# Patient Record
Sex: FEMALE | Race: WHITE | NOT HISPANIC OR LATINO | URBAN - METROPOLITAN AREA
[De-identification: names, ages, dates, MRNs, and addresses within clinical notes are randomized per-mention and may not be internally consistent; named-entity substitution may affect disease eponyms.]

---

## 2017-09-28 ENCOUNTER — APPOINTMENT (RX ONLY)
Dept: URBAN - METROPOLITAN AREA CLINIC 121 | Facility: CLINIC | Age: 57
Setting detail: DERMATOLOGY
End: 2017-09-28

## 2017-09-28 DIAGNOSIS — T07XXXA INSECT BITE, NONVENOMOUS, OF OTHER, MULTIPLE, AND UNSPECIFIED SITES, WITHOUT MENTION OF INFECTION: ICD-10-CM

## 2017-09-28 PROBLEM — S90.861A INSECT BITE (NONVENOMOUS), RIGHT FOOT, INITIAL ENCOUNTER: Status: ACTIVE | Noted: 2017-09-28

## 2017-09-28 PROCEDURE — ? PRESCRIPTION

## 2017-09-28 PROCEDURE — 99213 OFFICE O/P EST LOW 20 MIN: CPT

## 2017-09-28 PROCEDURE — ? COUNSELING

## 2017-09-28 RX ORDER — AZITHROMYCIN DIHYDRATE 250 MG/1
TABLET, FILM COATED ORAL
Qty: 6 | Refills: 0 | Status: ERX

## 2017-09-28 RX ORDER — MUPIROCIN 20 MG/G
OINTMENT TOPICAL
Qty: 1 | Refills: 2 | Status: ERX

## 2017-09-28 ASSESSMENT — LOCATION ZONE DERM: LOCATION ZONE: FEET

## 2017-09-28 ASSESSMENT — LOCATION DETAILED DESCRIPTION DERM: LOCATION DETAILED: RIGHT DORSAL FOOT

## 2017-09-28 ASSESSMENT — LOCATION SIMPLE DESCRIPTION DERM: LOCATION SIMPLE: RIGHT FOOT

## 2022-07-09 ENCOUNTER — TELEPHONE ENCOUNTER (OUTPATIENT)
Dept: URBAN - METROPOLITAN AREA CLINIC 121 | Facility: CLINIC | Age: 62
End: 2022-07-09

## 2022-07-09 RX ORDER — MESALAMINE 1.2 G/1
TAKE TWO TABLETS ONCE DAILY TABLET, DELAYED RELEASE ORAL
Refills: 11 | OUTPATIENT
Start: 2012-04-02 | End: 2012-07-09

## 2022-07-09 RX ORDER — ATORVASTATIN CALCIUM 20 MG/1
TABLET, FILM COATED ORAL ONCE A DAY
Refills: 0 | OUTPATIENT
Start: 2017-02-27 | End: 2017-05-23

## 2022-07-09 RX ORDER — ROSUVASTATIN CALCIUM 10 MG
TABLET ORAL TAKE AS DIRECTED
Refills: 0 | OUTPATIENT
Start: 2009-06-08 | End: 2010-06-17

## 2022-07-09 RX ORDER — PREDNISONE 10 MG/1
UD TABLET ORAL
Refills: 2 | OUTPATIENT
Start: 2010-01-04 | End: 2012-01-05

## 2022-07-09 RX ORDER — MESALAMINE 1.2 G/1
TAKE TWO TABLETS ONCE DAILY TABLET, DELAYED RELEASE ORAL
Refills: 0 | OUTPATIENT
Start: 2012-02-06 | End: 2012-04-02

## 2022-07-09 RX ORDER — PREDNISONE 10 MG/1
2 A DAY FOR 2 WEEKS THEN 1 A DAY TABLET ORAL
Refills: 0 | OUTPATIENT
Start: 2009-06-16 | End: 2011-12-22

## 2022-07-09 RX ORDER — PREDNISONE 20 MG/1
TABLET ORAL ONCE A DAY
Refills: 0 | OUTPATIENT
Start: 2010-01-04 | End: 2010-04-19

## 2022-07-09 RX ORDER — FERROUS FUMARATE AND POLYSACCHRIDE IRON VITAMIN MINERAL COMPLEX SUPPLEMENT 191.2; 135.9; 1; 210; 20; 5; 5; 7; 25; 3 MG/1; MG/1; MG/1; MG/1; MG/1; MG/1; MG/1; MG/1; MG/1; UG/1
CAPSULE ORAL
Refills: 0 | OUTPATIENT
Start: 2010-07-15 | End: 2010-09-10

## 2022-07-09 RX ORDER — MESALAMINE 1.2 G/1
2 A DAY TABLET, DELAYED RELEASE ORAL
Refills: 4 | OUTPATIENT
Start: 2010-06-17 | End: 2010-09-10

## 2022-07-10 ENCOUNTER — TELEPHONE ENCOUNTER (OUTPATIENT)
Dept: URBAN - METROPOLITAN AREA CLINIC 121 | Facility: CLINIC | Age: 62
End: 2022-07-10

## 2022-07-10 RX ORDER — PREDNISONE 10 MG/1
TAKE TWO TABLETS DAILY FOR 4 WEEKS THEN TAPER TO ONE TABLET DAILY FOR 2 WEEKS TABLET ORAL
Refills: 2 | Status: ACTIVE | COMMUNITY
Start: 2012-01-05

## 2022-07-10 RX ORDER — PREDNISONE 10 MG/1
2 A DAY FOR 1 WEEK THEN 1 A DAY FOR 1 WEEK THEN 1/2 TABLET DAILY FOR 2 WEEKS TABLET ORAL
Refills: 0 | Status: ACTIVE | COMMUNITY
Start: 2011-12-22

## 2022-07-10 RX ORDER — ATORVASTATIN CALCIUM 20 MG/1
TABLET, FILM COATED ORAL ONCE A DAY
Refills: 0 | Status: ACTIVE | COMMUNITY
Start: 2017-05-23

## 2022-07-10 RX ORDER — PREDNISONE 20 MG/1
1QD TABLET ORAL
Refills: 0 | Status: ACTIVE | COMMUNITY
Start: 2010-06-29

## 2022-07-10 RX ORDER — MESALAMINE 1.2 G/1
TAKE TWO TABLETS BY MOUTH TWICE DAILY TABLET, DELAYED RELEASE ORAL
Refills: 2 | Status: ACTIVE | COMMUNITY
Start: 2014-09-26

## 2022-07-10 RX ORDER — PREDNISONE 20 MG/1
TAKE 1 TABLET PO QD X 3WEEKS THEN DECREASE TO 10MG PO QD X 2 WEEKS TABLET ORAL ONCE A DAY
Refills: 1 | Status: ACTIVE | COMMUNITY
Start: 2009-10-23

## 2023-03-03 ENCOUNTER — APPOINTMENT (RX ONLY)
Dept: URBAN - METROPOLITAN AREA CLINIC 121 | Facility: CLINIC | Age: 63
Setting detail: DERMATOLOGY
End: 2023-03-03

## 2023-03-03 DIAGNOSIS — Z02.9 ENCOUNTER FOR ADMINISTRATIVE EXAMINATIONS, UNSPECIFIED: ICD-10-CM

## 2023-07-26 ENCOUNTER — WEB ENCOUNTER (OUTPATIENT)
Dept: URBAN - METROPOLITAN AREA CLINIC 60 | Facility: CLINIC | Age: 63
End: 2023-07-26

## 2023-07-26 ENCOUNTER — LAB OUTSIDE AN ENCOUNTER (OUTPATIENT)
Dept: URBAN - METROPOLITAN AREA CLINIC 60 | Facility: CLINIC | Age: 63
End: 2023-07-26

## 2023-07-26 ENCOUNTER — OFFICE VISIT (OUTPATIENT)
Dept: URBAN - METROPOLITAN AREA CLINIC 60 | Facility: CLINIC | Age: 63
End: 2023-07-26
Payer: COMMERCIAL

## 2023-07-26 VITALS
WEIGHT: 196 LBS | TEMPERATURE: 96.4 F | HEIGHT: 68 IN | BODY MASS INDEX: 29.7 KG/M2 | OXYGEN SATURATION: 97 % | SYSTOLIC BLOOD PRESSURE: 126 MMHG | DIASTOLIC BLOOD PRESSURE: 80 MMHG | HEART RATE: 82 BPM

## 2023-07-26 DIAGNOSIS — R10.84 GENERALIZED ABDOMINAL PAIN: ICD-10-CM

## 2023-07-26 DIAGNOSIS — Z12.11 SCREEN FOR COLON CANCER: ICD-10-CM

## 2023-07-26 DIAGNOSIS — K59.1 FUNCTIONAL DIARRHEA: ICD-10-CM

## 2023-07-26 DIAGNOSIS — K51.80 OTHER ULCERATIVE COLITIS WITHOUT COMPLICATION: ICD-10-CM

## 2023-07-26 PROBLEM — 47812002: Status: ACTIVE | Noted: 2023-07-26

## 2023-07-26 PROBLEM — 64766004: Status: ACTIVE | Noted: 2023-07-26

## 2023-07-26 PROBLEM — 305058001: Status: ACTIVE | Noted: 2023-07-26

## 2023-07-26 PROCEDURE — 99203 OFFICE O/P NEW LOW 30 MIN: CPT | Performed by: NURSE PRACTITIONER

## 2023-07-26 RX ORDER — ERGOCALCIFEROL (VITAMIN D2) 10 MCG
2 TABLETS TABLET ORAL ONCE A DAY
Qty: 60 | Status: ACTIVE | COMMUNITY
Start: 2023-07-26 | End: 2023-08-25

## 2023-07-26 RX ORDER — EZETIMIBE 10 MG/1
TAKE 1 TABLET BY MOUTH EVERY DAY TABLET ORAL
Qty: 30 EACH | Refills: 0 | Status: ACTIVE | COMMUNITY

## 2023-07-26 RX ORDER — MESALAMINE 1.2 G/1
2 TABLETS WITH A MEAL TABLET, DELAYED RELEASE ORAL TWICE A DAY
Qty: 360 TABLET | Refills: 0 | OUTPATIENT
Start: 2023-07-26 | End: 2023-10-24

## 2023-07-26 NOTE — PHYSICAL EXAM GASTROINTESTINAL
Abdomen , soft, diffuse mild tenderness nondistended , no guarding or rigidity , no masses palpable , normal bowel sounds , Liver and Spleen,  no hepatosplenomegaly , liver nontender

## 2023-07-26 NOTE — HPI-TODAY'S VISIT:
She is seen today for changes in bowel habits. Stools are smaller, looser and more frequent in the mornings. She has also seen blood on the tissue on a few occasios over the past few  weeks. She notes there is new cramping associated with BMs in the mornings and she now will have to have BM after most meals. Cramping is both on RLQ and LLQ. She has about 3-4 BMs daily. She has a history of Ulcerative colotis. Her last colonoscopy was completed 2020 with no evidence of Ulcerative colitis. Colonoscopy 2017 showed chrnic quiecent UC. Colonoscopy 2014 showed active chronic colitis.  She has not been on any medication for the UC over the past 3 years. Prior she was taking LIalda.

## 2023-08-02 ENCOUNTER — WEB ENCOUNTER (OUTPATIENT)
Dept: URBAN - METROPOLITAN AREA CLINIC 60 | Facility: CLINIC | Age: 63
End: 2023-08-02

## 2023-08-02 ENCOUNTER — LAB OUTSIDE AN ENCOUNTER (OUTPATIENT)
Dept: URBAN - METROPOLITAN AREA CLINIC 60 | Facility: CLINIC | Age: 63
End: 2023-08-02

## 2023-08-03 LAB
CALPROTECTIN, FECAL: 524
CAMPYLOBACTER SPP. AG,EIA: (no result)
CLOSTRIDIUM DIFFICILE: (no result)
CRYPTOSPORIDIUM ANTIGEN,: (no result)
GIARDIA AG, EIA, STOOL: (no result)
OVA AND PARASITES, CONC AND PERM SMEAR: (no result)
SALMONELLA AND SHIGELLA, CULTURE: (no result)
SHIGA TOXINS, EIA W/RFL TO E.COLI O157 CULTURE: (no result)

## 2023-08-08 ENCOUNTER — LAB OUTSIDE AN ENCOUNTER (OUTPATIENT)
Dept: URBAN - METROPOLITAN AREA CLINIC 63 | Facility: CLINIC | Age: 63
End: 2023-08-08

## 2023-08-08 ENCOUNTER — OFFICE VISIT (OUTPATIENT)
Dept: URBAN - METROPOLITAN AREA CLINIC 63 | Facility: CLINIC | Age: 63
End: 2023-08-08
Payer: COMMERCIAL

## 2023-08-08 VITALS
HEIGHT: 68 IN | TEMPERATURE: 96.8 F | OXYGEN SATURATION: 98 % | SYSTOLIC BLOOD PRESSURE: 120 MMHG | BODY MASS INDEX: 29.55 KG/M2 | WEIGHT: 195 LBS | DIASTOLIC BLOOD PRESSURE: 80 MMHG | HEART RATE: 98 BPM

## 2023-08-08 DIAGNOSIS — K57.90 DIVERTICULOSIS: ICD-10-CM

## 2023-08-08 DIAGNOSIS — Z87.19 HISTORY OF ULCERATIVE COLITIS: ICD-10-CM

## 2023-08-08 PROCEDURE — 99213 OFFICE O/P EST LOW 20 MIN: CPT | Performed by: INTERNAL MEDICINE

## 2023-08-08 RX ORDER — ERGOCALCIFEROL (VITAMIN D2) 10 MCG
2 TABLETS TABLET ORAL ONCE A DAY
Qty: 60 | Status: ACTIVE | COMMUNITY
Start: 2023-07-26 | End: 2023-08-25

## 2023-08-08 RX ORDER — MESALAMINE 1.2 G/1
2 TABLETS WITH A MEAL TABLET, DELAYED RELEASE ORAL TWICE A DAY
Qty: 360 TABLET | Refills: 0 | Status: ACTIVE | COMMUNITY
Start: 2023-07-26 | End: 2023-10-24

## 2023-08-08 RX ORDER — EZETIMIBE 10 MG/1
TAKE 1 TABLET BY MOUTH EVERY DAY TABLET ORAL
Qty: 30 EACH | Refills: 0 | Status: ACTIVE | COMMUNITY

## 2023-08-08 NOTE — HPI-TODAY'S VISIT:
Patient was having increasing loose stools and seeing blood in the stool she was seen by DELFIN Gonzalez and a CT of the abdomen was performed which showed some mild atherosclerosis of the abdominal vessels and some fatty liver disease and diverticulosis.  He did labs and stool studies all the stool studies were negative with the exception of the fecal calprotectin which was 524 she continues to have loose stools and she sees blood regularly

## 2023-08-14 ENCOUNTER — CLAIMS CREATED FROM THE CLAIM WINDOW (OUTPATIENT)
Dept: URBAN - METROPOLITAN AREA CLINIC 4 | Facility: CLINIC | Age: 63
End: 2023-08-14
Payer: COMMERCIAL

## 2023-08-14 ENCOUNTER — OFFICE VISIT (OUTPATIENT)
Dept: URBAN - METROPOLITAN AREA SURGERY CENTER 4 | Facility: SURGERY CENTER | Age: 63
End: 2023-08-14
Payer: COMMERCIAL

## 2023-08-14 ENCOUNTER — TELEPHONE ENCOUNTER (OUTPATIENT)
Dept: URBAN - METROPOLITAN AREA CLINIC 63 | Facility: CLINIC | Age: 63
End: 2023-08-14

## 2023-08-14 ENCOUNTER — OFFICE VISIT (OUTPATIENT)
Dept: URBAN - METROPOLITAN AREA SURGERY CENTER 4 | Facility: SURGERY CENTER | Age: 63
End: 2023-08-14

## 2023-08-14 DIAGNOSIS — K63.89 OTHER SPECIFIED DISEASES OF INTESTINE: ICD-10-CM

## 2023-08-14 DIAGNOSIS — K63.3 ULCER OF INTESTINE: ICD-10-CM

## 2023-08-14 DIAGNOSIS — Z12.11 COLON CANCER SCREENING (HIGH RISK): ICD-10-CM

## 2023-08-14 DIAGNOSIS — K52.9 CHRONIC DIARRHEA: ICD-10-CM

## 2023-08-14 DIAGNOSIS — K62.89 OTHER SPECIFIED DISEASES OF ANUS AND RECTUM: ICD-10-CM

## 2023-08-14 DIAGNOSIS — K57.30 DIVERTICULA OF COLON: ICD-10-CM

## 2023-08-14 DIAGNOSIS — K51.80 OTHER ULCERATIVE COLITIS WITHOUT COMPLICATIONS: ICD-10-CM

## 2023-08-14 DIAGNOSIS — K62.89 MUCOSAL ABNORMALITY OF RECTUM: ICD-10-CM

## 2023-08-14 PROBLEM — 64766004: Status: ACTIVE | Noted: 2023-08-14

## 2023-08-14 PROCEDURE — 45380 COLONOSCOPY AND BIOPSY: CPT | Performed by: INTERNAL MEDICINE

## 2023-08-14 PROCEDURE — 88305 TISSUE EXAM BY PATHOLOGIST: CPT | Performed by: PATHOLOGY

## 2023-08-14 PROCEDURE — 00811 ANES LWR INTST NDSC NOS: CPT | Performed by: NURSE ANESTHETIST, CERTIFIED REGISTERED

## 2023-08-14 RX ORDER — MESALAMINE 1.2 G/1
2 TABLETS WITH A MEAL TABLET, DELAYED RELEASE ORAL TWICE A DAY
Qty: 360 TABLET | Refills: 0 | Status: ACTIVE | COMMUNITY
Start: 2023-07-26 | End: 2023-10-24

## 2023-08-14 RX ORDER — MESALAMINE 1.2 G/1
2 TABLETS WITH A MEAL TABLET, DELAYED RELEASE ORAL TWICE A DAY
Qty: 360 TABLET | Refills: 0 | COMMUNITY
Start: 2023-07-26 | End: 2023-10-24

## 2023-08-14 RX ORDER — PREDNISONE 20 MG/1
2 TABLETS ONCE A DAY FOR 30 DAYS, 1 TABLET ONCE A DAY FOR 30 DAYS, 12 TABLETS ONCE A DAY FOR 30 DAYS TABLET ORAL
Qty: 60 | Refills: 3 | OUTPATIENT
Start: 2023-08-14 | End: 2023-09-13

## 2023-08-14 RX ORDER — EZETIMIBE 10 MG/1
TAKE 1 TABLET BY MOUTH EVERY DAY TABLET ORAL
Qty: 30 EACH | Refills: 0 | Status: ACTIVE | COMMUNITY

## 2023-08-14 RX ORDER — EZETIMIBE 10 MG/1
TAKE 1 TABLET BY MOUTH EVERY DAY TABLET ORAL
Qty: 30 EACH | Refills: 0 | COMMUNITY

## 2023-08-14 RX ORDER — ERGOCALCIFEROL (VITAMIN D2) 10 MCG
2 TABLETS TABLET ORAL ONCE A DAY
Qty: 60 | COMMUNITY
Start: 2023-07-26 | End: 2023-08-25

## 2023-08-14 RX ORDER — ERGOCALCIFEROL (VITAMIN D2) 10 MCG
2 TABLETS TABLET ORAL ONCE A DAY
Qty: 60 | Status: ACTIVE | COMMUNITY
Start: 2023-07-26 | End: 2023-08-25

## 2023-09-01 ENCOUNTER — OFFICE VISIT (OUTPATIENT)
Dept: URBAN - METROPOLITAN AREA CLINIC 60 | Facility: CLINIC | Age: 63
End: 2023-09-01

## 2023-09-01 RX ORDER — MESALAMINE 1.2 G/1
2 TABLETS WITH A MEAL TABLET, DELAYED RELEASE ORAL TWICE A DAY
Qty: 360 TABLET | Refills: 0 | Status: ACTIVE | COMMUNITY
Start: 2023-07-26 | End: 2023-10-24

## 2023-09-01 RX ORDER — EZETIMIBE 10 MG/1
TAKE 1 TABLET BY MOUTH EVERY DAY TABLET ORAL
Qty: 30 EACH | Refills: 0 | Status: ACTIVE | COMMUNITY

## 2023-09-01 RX ORDER — PREDNISONE 20 MG/1
2 TABLETS ONCE A DAY FOR 30 DAYS, 1 TABLET ONCE A DAY FOR 30 DAYS, 12 TABLETS ONCE A DAY FOR 30 DAYS TABLET ORAL
Qty: 60 | Refills: 3 | Status: ACTIVE | COMMUNITY
Start: 2023-08-14 | End: 2023-09-13

## 2023-09-08 ENCOUNTER — OFFICE VISIT (OUTPATIENT)
Dept: URBAN - METROPOLITAN AREA CLINIC 60 | Facility: CLINIC | Age: 63
End: 2023-09-08
Payer: COMMERCIAL

## 2023-09-08 VITALS
DIASTOLIC BLOOD PRESSURE: 78 MMHG | WEIGHT: 197 LBS | RESPIRATION RATE: 20 BRPM | HEIGHT: 68 IN | HEART RATE: 83 BPM | OXYGEN SATURATION: 96 % | TEMPERATURE: 96.6 F | SYSTOLIC BLOOD PRESSURE: 110 MMHG | BODY MASS INDEX: 29.86 KG/M2

## 2023-09-08 DIAGNOSIS — K51.50 LEFT SIDED ULCERATIVE (CHRONIC) COLITIS: ICD-10-CM

## 2023-09-08 PROBLEM — 441971007: Status: ACTIVE | Noted: 2023-09-08

## 2023-09-08 PROCEDURE — 99213 OFFICE O/P EST LOW 20 MIN: CPT | Performed by: NURSE PRACTITIONER

## 2023-09-08 RX ORDER — EZETIMIBE 10 MG/1
TAKE 1 TABLET BY MOUTH EVERY DAY TABLET ORAL
Qty: 30 EACH | Refills: 0 | Status: ACTIVE | COMMUNITY

## 2023-09-08 RX ORDER — ERGOCALCIFEROL CAPSULES, 1.25 MG/1
1 CAPSULE CAPSULE ORAL WEEKLY
Status: ACTIVE | COMMUNITY

## 2023-09-08 RX ORDER — MESALAMINE 1.2 G/1
2 TABLETS WITH A MEAL TABLET, DELAYED RELEASE ORAL TWICE A DAY
Qty: 360 TABLET | Refills: 0 | Status: ACTIVE | COMMUNITY
Start: 2023-07-26 | End: 2023-10-24

## 2023-09-08 RX ORDER — PREDNISONE 20 MG/1
2 TABLETS ONCE A DAY FOR 30 DAYS, 1 TABLET ONCE A DAY FOR 30 DAYS, 12 TABLETS ONCE A DAY FOR 30 DAYS TABLET ORAL
Qty: 60 | Refills: 3 | Status: ACTIVE | COMMUNITY
Start: 2023-08-14 | End: 2023-09-13

## 2023-09-08 NOTE — HPI-TODAY'S VISIT:
Colonoscopy revealed moderately active UC in rectum through sigmoid.  She has finished taking prednisone 40 mg once daily for 30 days and has just tapered dose to prednisone 20 mg once daily.  Her symptoms have not greatly resolved at this time.  She still has some intermittent cramping and loose stools.  However only having 3-4 BMs daily without hematochezia.

## 2023-09-26 ENCOUNTER — ERX REFILL RESPONSE (OUTPATIENT)
Dept: URBAN - METROPOLITAN AREA CLINIC 60 | Facility: CLINIC | Age: 63
End: 2023-09-26

## 2023-09-26 RX ORDER — MESALAMINE 1.2 G/1
TAKE 2 TABLETS BY MOUTH TWICE DAILY WITH A MEAL TABLET, DELAYED RELEASE ORAL
Qty: 360 TABLET | Refills: 1 | OUTPATIENT

## 2023-09-26 RX ORDER — MESALAMINE 1.2 G/1
2 TABLETS WITH A MEAL TABLET, DELAYED RELEASE ORAL TWICE A DAY
Qty: 360 TABLET | Refills: 0 | OUTPATIENT

## 2023-09-27 ENCOUNTER — OFFICE VISIT (OUTPATIENT)
Dept: URBAN - METROPOLITAN AREA CLINIC 60 | Facility: CLINIC | Age: 63
End: 2023-09-27

## 2023-10-13 ENCOUNTER — LAB OUTSIDE AN ENCOUNTER (OUTPATIENT)
Dept: URBAN - METROPOLITAN AREA CLINIC 60 | Facility: CLINIC | Age: 63
End: 2023-10-13

## 2023-10-13 ENCOUNTER — OFFICE VISIT (OUTPATIENT)
Dept: URBAN - METROPOLITAN AREA CLINIC 60 | Facility: CLINIC | Age: 63
End: 2023-10-13
Payer: COMMERCIAL

## 2023-10-13 VITALS
HEIGHT: 68 IN | WEIGHT: 196 LBS | SYSTOLIC BLOOD PRESSURE: 128 MMHG | OXYGEN SATURATION: 96 % | TEMPERATURE: 97.6 F | BODY MASS INDEX: 29.7 KG/M2 | RESPIRATION RATE: 20 BRPM | DIASTOLIC BLOOD PRESSURE: 80 MMHG | HEART RATE: 74 BPM

## 2023-10-13 DIAGNOSIS — K51.50 LEFT SIDED ULCERATIVE (CHRONIC) COLITIS: ICD-10-CM

## 2023-10-13 PROCEDURE — 99213 OFFICE O/P EST LOW 20 MIN: CPT | Performed by: NURSE PRACTITIONER

## 2023-10-13 RX ORDER — EZETIMIBE 10 MG/1
TAKE 1 TABLET BY MOUTH EVERY DAY TABLET ORAL
Qty: 30 EACH | Refills: 0 | Status: ACTIVE | COMMUNITY

## 2023-10-13 RX ORDER — PREDNISONE 2.5 MG/1
2 TABLETS ONCE A DAY FOR 5 DAYS, 1 TABLET ONCE A DAY FOR 5 DAYS, 0.5 TABLET ONCE A DAY FOR 4 DAYS TABLET ORAL
Qty: 17 | Refills: 0 | OUTPATIENT
Start: 2023-10-13 | End: 2023-10-27

## 2023-10-13 RX ORDER — MESALAMINE 1.2 G/1
2 TABLETS WITH A MEAL TABLET, DELAYED RELEASE ORAL TWICE A DAY
Qty: 360 TABLET | Refills: 2 | OUTPATIENT
Start: 2023-10-13 | End: 2024-07-09

## 2023-10-13 RX ORDER — MESALAMINE 1.2 G/1
TAKE 2 TABLETS BY MOUTH TWICE DAILY WITH A MEAL TABLET, DELAYED RELEASE ORAL
Qty: 360 TABLET | Refills: 1 | Status: ACTIVE | COMMUNITY

## 2023-10-13 RX ORDER — ERGOCALCIFEROL CAPSULES, 1.25 MG/1
1 CAPSULE CAPSULE ORAL WEEKLY
Status: ACTIVE | COMMUNITY

## 2023-10-13 NOTE — HPI-TODAY'S VISIT:
She is here on follow up for Ulcerative colitis. She has been on Prednisone taper and Mesalamine 2.4g qd. UC flare symptoms have resolved. She is asymptomatic without complaints. Colonoscopy 08/14/23 revealed moderately active UC in rectum through sigmoid. Next screening recommended in 2 years.

## 2023-10-18 ENCOUNTER — TELEPHONE ENCOUNTER (OUTPATIENT)
Dept: URBAN - METROPOLITAN AREA CLINIC 60 | Facility: CLINIC | Age: 63
End: 2023-10-18

## 2023-10-19 ENCOUNTER — TELEPHONE ENCOUNTER (OUTPATIENT)
Dept: URBAN - METROPOLITAN AREA CLINIC 63 | Facility: CLINIC | Age: 63
End: 2023-10-19

## 2023-10-19 RX ORDER — PREDNISONE 10 MG/1
1 TABLET ONCE A DAY FOR 14 DAYS, 0.5 TABLET ONCE A DAY FOR 14 DAYS TABLET ORAL
Qty: 21 TABLET | OUTPATIENT
Start: 2023-10-24 | End: 2023-11-22

## 2023-12-23 ENCOUNTER — TELEPHONE ENCOUNTER (OUTPATIENT)
Dept: URBAN - METROPOLITAN AREA CLINIC 63 | Facility: CLINIC | Age: 63
End: 2023-12-23

## 2023-12-23 RX ORDER — MESALAMINE 4 G/60ML
AS DIRECTED SUSPENSION RECTAL ONCE DAILY
Qty: 30 | Refills: 2 | OUTPATIENT
Start: 2023-12-23 | End: 2024-03-22

## 2023-12-28 ENCOUNTER — TELEPHONE ENCOUNTER (OUTPATIENT)
Dept: URBAN - METROPOLITAN AREA CLINIC 60 | Facility: CLINIC | Age: 63
End: 2023-12-28

## 2023-12-28 RX ORDER — DICYCLOMINE HYDROCHLORIDE 10 MG/1
1 CAPSULE AS NEEDED CAPSULE ORAL TWICE DAILY
Qty: 60 | Refills: 1 | OUTPATIENT
Start: 2023-12-28 | End: 2024-02-26

## 2023-12-28 RX ORDER — MESALAMINE 1000 MG/1
1 SUPPOSITORY AT BEDTIME SUPPOSITORY RECTAL ONCE A DAY
Qty: 30 | Refills: 1 | OUTPATIENT
Start: 2023-12-28 | End: 2024-02-26

## 2024-02-03 LAB — CLOSTRIDIUM DIFFICILE: (no result)

## 2024-02-06 PROBLEM — 197321007: Status: ACTIVE | Noted: 2024-02-06

## 2024-02-07 LAB
A/G RATIO: 1.8
ALBUMIN: 4.6
ALKALINE PHOSPHATASE: 71
ALT (SGPT): 29
AST (SGOT): 22
BILIRUBIN, TOTAL: 0.6
BUN/CREATININE RATIO: (no result)
BUN: 12
C-REACTIVE PROTEIN, QUANT: 4.6
CALCIUM: 9.3
CARBON DIOXIDE, TOTAL: 25
CHLORIDE: 105
CREATININE: 0.6
EGFR: 101
GLOBULIN, TOTAL: 2.5
GLUCOSE: 108
HEMATOCRIT: 43.2
HEMOGLOBIN: 14.7
HEP A AB, IGM: (no result)
HEP B CORE AB, IGM: (no result)
HEPATITIS A AB, TOTAL: (no result)
HEPATITIS B CORE AB TOTAL: (no result)
HEPATITIS B SURFACE AB IMMUNITY, QN: <5
HEPATITIS B SURFACE ANTIGEN: (no result)
HEPATITIS C ANTIBODY: (no result)
MCH: 30.2
MCHC: 34
MCV: 88.9
MITOGEN-NIL: 9.1
MPV: 12.2
PLATELET COUNT: 213
POTASSIUM: 4.2
PROTEIN, TOTAL: 7.1
QUANTIFERON NIL VALUE: 0.02
QUANTIFERON TB1 AG VALUE: <0
QUANTIFERON TB2 AG VALUE: <0
QUANTIFERON-TB GOLD PLUS: NEGATIVE
RDW: 11.9
RED BLOOD CELL COUNT: 4.86
SODIUM: 139
WHITE BLOOD CELL COUNT: 5.5

## 2024-02-08 ENCOUNTER — LAB (OUTPATIENT)
Dept: URBAN - METROPOLITAN AREA CLINIC 63 | Facility: CLINIC | Age: 64
End: 2024-02-08

## 2024-02-08 ENCOUNTER — OV EP (OUTPATIENT)
Dept: URBAN - METROPOLITAN AREA CLINIC 63 | Facility: CLINIC | Age: 64
End: 2024-02-08
Payer: COMMERCIAL

## 2024-02-08 VITALS
HEART RATE: 93 BPM | TEMPERATURE: 97.4 F | BODY MASS INDEX: 28.95 KG/M2 | HEIGHT: 68 IN | WEIGHT: 191 LBS | OXYGEN SATURATION: 95 % | DIASTOLIC BLOOD PRESSURE: 80 MMHG | SYSTOLIC BLOOD PRESSURE: 120 MMHG

## 2024-02-08 DIAGNOSIS — K21.9 CHRONIC GERD: ICD-10-CM

## 2024-02-08 DIAGNOSIS — K92.1 HEMATOCHEZIA: ICD-10-CM

## 2024-02-08 DIAGNOSIS — K51.50 LEFT SIDED ULCERATIVE (CHRONIC) COLITIS: ICD-10-CM

## 2024-02-08 DIAGNOSIS — K76.0 FATTY LIVER: ICD-10-CM

## 2024-02-08 PROCEDURE — 99214 OFFICE O/P EST MOD 30 MIN: CPT | Performed by: PHYSICIAN ASSISTANT

## 2024-02-08 RX ORDER — DICYCLOMINE HYDROCHLORIDE 10 MG/1
1 CAPSULE AS NEEDED CAPSULE ORAL TWICE DAILY
Qty: 60 | Refills: 1 | Status: ACTIVE | COMMUNITY
Start: 2023-12-28 | End: 2024-02-26

## 2024-02-08 RX ORDER — EZETIMIBE 10 MG/1
TAKE 1 TABLET BY MOUTH EVERY DAY TABLET ORAL
Qty: 30 EACH | Refills: 0 | Status: ACTIVE | COMMUNITY

## 2024-02-08 RX ORDER — MESALAMINE 4 G/60ML
AS DIRECTED SUSPENSION RECTAL ONCE DAILY
Qty: 30 | Refills: 2 | Status: ACTIVE | COMMUNITY
Start: 2023-12-23 | End: 2024-03-22

## 2024-02-08 RX ORDER — ERGOCALCIFEROL CAPSULES, 1.25 MG/1
1 CAPSULE CAPSULE ORAL WEEKLY
Status: ACTIVE | COMMUNITY

## 2024-02-08 RX ORDER — MESALAMINE 1000 MG/1
1 SUPPOSITORY AT BEDTIME SUPPOSITORY RECTAL ONCE A DAY
Qty: 30 | Refills: 1 | Status: ACTIVE | COMMUNITY
Start: 2023-12-28 | End: 2024-02-26

## 2024-02-08 NOTE — HPI-TODAY'S VISIT:
Michelle is a pleasant 63-year-old female who presents today for evaluation of ulcerative colitis.  Previously evaluated by Dominik Gonzalez.  Consideration of starting Rinvoq pending clinical course.  Will also consider budesonide foam versus Uceris if minimal improvement with Canasa suppositories.  She did not tolerate mesalamine enemas.  She was also sent Bentyl to help with abdominal cramping.  Previously treated with prednisone taper and mesalamine 4.8 g daily.  Patient previously failed Humira  Labs dated 2/7/2024 showed negative C. difficile.  Normal hemoglobin.  Normal platelets.  Normal renal function.  Normal LFTs.  Normal CRP.  HCV antibody negative.  No immunity to hep A.  No immunity to hep B.  QuantiFERON gold negative.  Colonoscopy in August 2023 showed eroded (linear pattern), hemorrhagic, inflamed and ulcerated mucosa in the rectum, rectosigmoid colon and sigmoid colon.  Pandiverticulosis coli.  Internal hemorrhoids.  Pathology showed patchy chronic active colitis consistent with partially treated UC.  Negative for infectious organisms, dysplasia or malignancy.  Rectal biopsy showed no significant abnormality  Labs dated 7/6/2023 showed normal renal function.  Normal LFTs.  Normal TSH.  Normal hemoglobin.  Normal platelets.  CT abdomen/pelvis with contrast dated 8/1/2023 showed no acute abnormality.  Hepatomegaly and fatty infiltration of the liver.  Consider FibroScan.  Diffuse colonic diverticulosis most severe within the sigmoid colon  Stool studies dated 8/3/2023 showed calprotectin of 524 but otherwise negative.  C. difficile negative.  EGD in January 2020 showed a normal esophagus.  Gastritis.  Normal duodenum.  Mild chronic inactive gastritis.  Negative for H. pylori.  Colonoscopy January 2020 showed diverticulosis in the sigmoid and descending colon.  Internal hemorrhoids.  Sigmoid colon biopsy showed benign colonic mucosa with no specific pathologic alteration.  Negative for acute inflammation, granulomata, malignancy and dysplasia   Minimal efficacy with Canasa suppositories. She is currently taking Lialda 2.4 g daily. Chronic reflux well controlled on Prevacid 15 mg qd. Notes 5-6 daily bowel movements described as soft formed. Denies nausea, vomiting, dysphagia, odynophagia, hematemesis, coffee ground emesis, abdominal pain, abnormal weight loss or melena. Intermittent episodes of hematochezia. Denies family history of colon cancer or colon polyps. No other GI complaints at this time

## 2024-02-10 LAB — CALPROTECTIN, FECAL: 756

## 2024-02-21 LAB
A/G RATIO: 1.8
ALBUMIN: 4.4
ALKALINE PHOSPHATASE: 73
ALT (SGPT): 25
AST (SGOT): 20
BILIRUBIN, TOTAL: 0.6
BUN/CREATININE RATIO: (no result)
BUN: 16
C-REACTIVE PROTEIN, QUANT: 0.4
CALCIUM: 9.3
CARBON DIOXIDE, TOTAL: 27
CHLORIDE: 105
CREATININE: 0.67
EGFR: 98
GLOBULIN, TOTAL: 2.5
GLUCOSE: 103
HEMATOCRIT: 45.1
HEMOGLOBIN: 15
MCH: 29.1
MCHC: 33.3
MCV: 87.6
MPV: 12.2
PLATELET COUNT: 237
POTASSIUM: 4.5
PROTEIN, TOTAL: 6.9
RDW: 12.2
RED BLOOD CELL COUNT: 5.15
SODIUM: 141
WHITE BLOOD CELL COUNT: 4.5

## 2024-03-22 ENCOUNTER — LAB (OUTPATIENT)
Dept: URBAN - METROPOLITAN AREA CLINIC 63 | Facility: CLINIC | Age: 64
End: 2024-03-22

## 2024-03-22 ENCOUNTER — OV EP (OUTPATIENT)
Dept: URBAN - METROPOLITAN AREA CLINIC 63 | Facility: CLINIC | Age: 64
End: 2024-03-22
Payer: COMMERCIAL

## 2024-03-22 VITALS
SYSTOLIC BLOOD PRESSURE: 130 MMHG | HEART RATE: 75 BPM | WEIGHT: 188 LBS | DIASTOLIC BLOOD PRESSURE: 80 MMHG | OXYGEN SATURATION: 97 % | TEMPERATURE: 97 F | BODY MASS INDEX: 28.49 KG/M2 | HEIGHT: 68 IN

## 2024-03-22 DIAGNOSIS — K76.0 FATTY LIVER: ICD-10-CM

## 2024-03-22 DIAGNOSIS — K21.9 CHRONIC GERD: ICD-10-CM

## 2024-03-22 DIAGNOSIS — K51.50 LEFT SIDED ULCERATIVE (CHRONIC) COLITIS: ICD-10-CM

## 2024-03-22 PROCEDURE — 99214 OFFICE O/P EST MOD 30 MIN: CPT | Performed by: PHYSICIAN ASSISTANT

## 2024-03-22 RX ORDER — UPADACITINIB 45 MG/1
TAKE ONE TABLET BY MOUTH ONCE DAILY TABLET, EXTENDED RELEASE ORAL DAILY
Qty: 56 | Refills: 0 | Status: ACTIVE | COMMUNITY
Start: 2024-02-08 | End: 2024-04-08

## 2024-03-22 RX ORDER — ONDANSETRON HYDROCHLORIDE 4 MG/1
1 TABLET TABLET, FILM COATED ORAL
Qty: 2 | Refills: 0 | OUTPATIENT
Start: 2024-03-22

## 2024-03-22 RX ORDER — UPADACITINIB 30 MG/1
1 TABLET TABLET, EXTENDED RELEASE ORAL ONCE A DAY
Qty: 30 | Refills: 5 | Status: ACTIVE | COMMUNITY
Start: 2024-02-12 | End: 2024-08-10

## 2024-03-22 RX ORDER — MESALAMINE 4 G/60ML
AS DIRECTED SUSPENSION RECTAL ONCE DAILY
Qty: 30 | Refills: 2 | Status: ACTIVE | COMMUNITY
Start: 2023-12-23 | End: 2024-03-22

## 2024-03-22 RX ORDER — EZETIMIBE 10 MG/1
TAKE 1 TABLET BY MOUTH EVERY DAY TABLET ORAL
Qty: 30 EACH | Refills: 0 | Status: ACTIVE | COMMUNITY

## 2024-03-22 RX ORDER — ERGOCALCIFEROL CAPSULES, 1.25 MG/1
1 CAPSULE CAPSULE ORAL WEEKLY
Status: ACTIVE | COMMUNITY

## 2024-03-22 NOTE — HPI-TODAY'S VISIT:
Michelle is a pleasant 63-year-old female who presents today for evaluation of ulcerative colitis.  Previously evaluated by Dominik Gonzalez. She did not tolerate mesalamine enemas.  Taking Bentyl PRN to help with abdominal cramping.  Previously treated with prednisone taper and mesalamine 4.8 g daily. Minimal efficacy with Canasa suppositories. Patient previously failed Humira. Started on Rinvoq last visit  Labs dated 2/21/2024 showed normal CRP.  Glucose 103.  Normal renal function.  Normal LFTs.  Normal hemoglobin.  Normal platelets.  Labs dated 2/7/2024 showed negative C. difficile.  Normal hemoglobin.  Normal platelets.  Normal renal function.  Normal LFTs.  Normal CRP.  HCV antibody negative.  No immunity to hep A.  No immunity to hep B.  QuantiFERON gold negative. Calprotectin 756  Colonoscopy in August 2023 showed eroded (linear pattern), hemorrhagic, inflamed and ulcerated mucosa in the rectum, rectosigmoid colon and sigmoid colon.  Pandiverticulosis coli.  Internal hemorrhoids.  Pathology showed patchy chronic active colitis consistent with partially treated UC.  Negative for infectious organisms, dysplasia or malignancy.  Rectal biopsy showed no significant abnormality  Labs dated 7/6/2023 showed normal renal function.  Normal LFTs.  Normal TSH.  Normal hemoglobin.  Normal platelets.  CT abdomen/pelvis with contrast dated 8/1/2023 showed no acute abnormality.  Hepatomegaly and fatty infiltration of the liver.  Consider FibroScan.  Diffuse colonic diverticulosis most severe within the sigmoid colon  Stool studies dated 8/3/2023 showed calprotectin of 524 but otherwise negative.  C. difficile negative.  EGD in January 2020 showed a normal esophagus.  Gastritis.  Normal duodenum.  Mild chronic inactive gastritis.  Negative for H. pylori.  Colonoscopy January 2020 showed diverticulosis in the sigmoid and descending colon.  Internal hemorrhoids.  Sigmoid colon biopsy showed benign colonic mucosa with no specific pathologic alteration.  Negative for acute inflammation, granulomata, malignancy and dysplasia  She is currently taking Rinvoq. Markedly improved. Chronic reflux well controlled on Prevacid 15 mg qd. Notes occasional constipation, taking MiraLAX as needed with efficacy. Denies nausea, vomiting, dysphagia, odynophagia, hematemesis, coffee ground emesis, abdominal pain, abnormal weight loss, BRBPR or melena. Denies family history of colon cancer or colon polyps. No other GI complaints at this time

## 2024-03-29 ENCOUNTER — LAB (OUTPATIENT)
Dept: URBAN - METROPOLITAN AREA CLINIC 63 | Facility: CLINIC | Age: 64
End: 2024-03-29

## 2024-05-04 ENCOUNTER — TELEPHONE ENCOUNTER (OUTPATIENT)
Dept: URBAN - METROPOLITAN AREA CLINIC 63 | Facility: CLINIC | Age: 64
End: 2024-05-04

## 2024-05-04 PROBLEM — 307496006: Status: ACTIVE | Noted: 2024-05-04

## 2024-05-04 RX ORDER — METRONIDAZOLE 500 MG/1
1 TABLET TABLET ORAL THREE TIMES A DAY
Qty: 30 | OUTPATIENT
Start: 2024-05-04 | End: 2024-05-14

## 2024-05-04 RX ORDER — CIPROFLOXACIN 500 MG/1
1 TABLET TABLET, FILM COATED ORAL
Qty: 20 | OUTPATIENT
Start: 2024-05-04 | End: 2024-05-14

## 2024-05-06 ENCOUNTER — LAB OUTSIDE AN ENCOUNTER (OUTPATIENT)
Dept: URBAN - METROPOLITAN AREA CLINIC 63 | Facility: CLINIC | Age: 64
End: 2024-05-06

## 2024-05-10 ENCOUNTER — TELEPHONE ENCOUNTER (OUTPATIENT)
Dept: URBAN - METROPOLITAN AREA CLINIC 63 | Facility: CLINIC | Age: 64
End: 2024-05-10

## 2024-05-13 ENCOUNTER — TELEPHONE ENCOUNTER (OUTPATIENT)
Dept: URBAN - METROPOLITAN AREA CLINIC 63 | Facility: CLINIC | Age: 64
End: 2024-05-13

## 2024-05-28 ENCOUNTER — TELEPHONE ENCOUNTER (OUTPATIENT)
Dept: URBAN - METROPOLITAN AREA CLINIC 63 | Facility: CLINIC | Age: 64
End: 2024-05-28

## 2024-06-04 ENCOUNTER — OFFICE VISIT (OUTPATIENT)
Dept: URBAN - METROPOLITAN AREA SURGERY CENTER 4 | Facility: SURGERY CENTER | Age: 64
End: 2024-06-04

## 2024-06-13 ENCOUNTER — WEB ENCOUNTER (OUTPATIENT)
Dept: URBAN - METROPOLITAN AREA CLINIC 60 | Facility: CLINIC | Age: 64
End: 2024-06-13

## 2024-07-01 ENCOUNTER — OFFICE VISIT (OUTPATIENT)
Dept: URBAN - METROPOLITAN AREA CLINIC 63 | Facility: CLINIC | Age: 64
End: 2024-07-01
Payer: COMMERCIAL

## 2024-07-01 ENCOUNTER — DASHBOARD ENCOUNTERS (OUTPATIENT)
Age: 64
End: 2024-07-01

## 2024-07-01 VITALS
HEART RATE: 73 BPM | DIASTOLIC BLOOD PRESSURE: 80 MMHG | TEMPERATURE: 97.6 F | SYSTOLIC BLOOD PRESSURE: 120 MMHG | BODY MASS INDEX: 27.61 KG/M2 | HEIGHT: 68 IN | WEIGHT: 182.2 LBS | OXYGEN SATURATION: 99 %

## 2024-07-01 DIAGNOSIS — K51.50 LEFT SIDED ULCERATIVE (CHRONIC) COLITIS: ICD-10-CM

## 2024-07-01 DIAGNOSIS — K21.9 CHRONIC GERD: ICD-10-CM

## 2024-07-01 DIAGNOSIS — K76.0 FATTY LIVER: ICD-10-CM

## 2024-07-01 DIAGNOSIS — Z87.19 HISTORY OF DIVERTICULITIS: ICD-10-CM

## 2024-07-01 PROCEDURE — 99214 OFFICE O/P EST MOD 30 MIN: CPT

## 2024-07-01 RX ORDER — UPADACITINIB 30 MG/1
1 TABLET TABLET, EXTENDED RELEASE ORAL ONCE A DAY
Qty: 30 | Refills: 5 | Status: ACTIVE | COMMUNITY
Start: 2024-02-12 | End: 2024-08-10

## 2024-07-01 RX ORDER — ONDANSETRON HYDROCHLORIDE 4 MG/1
1 TABLET TABLET, FILM COATED ORAL
Qty: 2 | Refills: 0 | OUTPATIENT
Start: 2024-07-01

## 2024-07-01 RX ORDER — ERGOCALCIFEROL CAPSULES, 1.25 MG/1
1 CAPSULE CAPSULE ORAL WEEKLY
Status: ACTIVE | COMMUNITY

## 2024-07-01 RX ORDER — EZETIMIBE 10 MG/1
TAKE 1 TABLET BY MOUTH EVERY DAY TABLET ORAL
Qty: 30 EACH | Refills: 0 | Status: ACTIVE | COMMUNITY

## 2024-07-01 NOTE — HPI-PREVIOUS IMAGING
5/10/2024 CT abdomen pelvis with contrast consistent with acute transverse colon diverticulitis with no organized drainable fluid collection.  CT abdomen/pelvis with contrast dated 8/1/2023 showed no acute abnormality. Hepatomegaly and fatty infiltration of the liver. Consider FibroScan. Diffuse colonic diverticulosis most severe within the sigmoid colon

## 2024-07-01 NOTE — HPI-PREVIOUS PROCEDURES
Colonoscopy in August 2023 showed eroded (linear pattern), hemorrhagic, inflamed and ulcerated mucosa in the rectum, rectosigmoid colon and sigmoid colon. Pandiverticulosis coli. Internal hemorrhoids. Pathology showed patchy chronic active colitis consistent with partially treated UC. Negative for infectious organisms, dysplasia or malignancy. Rectal biopsy showed no significant abnormality   Colonoscopy January 2020 showed diverticulosis in the sigmoid and descending colon. Internal hemorrhoids. Sigmoid colon biopsy showed benign colonic mucosa with no specific pathologic alteration. Negative for acute inflammation, granulomata, malignancy and dysplasia  EGD in January 2020 showed a normal esophagus. Gastritis. Normal duodenum. Mild chronic inactive gastritis. Negative for H. pylori.

## 2024-07-01 NOTE — HPI-ZZZTODAY'S VISIT
Patient is a very pleasant 63-year-old female who presents for diverticulitis.  She is  previously a patient of Dr. Crook.  Last seen by DELFIN Tucker 3/22/2024.  She will be establishing care with Dr. Joyce today.  Past medical history significant for ulcerative colitis, GERD, osteoarthritis, breast cancer, vitamin D deficiency, pulmonary sarcoidosis, hyperlipidemia, hepatic steatosis.  Past surgical history significant for , mastectomy.  Last colonoscopy 2023 with Dr. Montalvo.  Last endoscopy 2020.  Family history noncontributory. Jorge was diagnosed with ulcerative colitis in .  Last colonoscopy 2023 demonstrating active inflammation in the rectum rectosigmoid colon and sigmoid colon.  She is currently taking Rinvoq 30 mg. She has previously failed mesalamine, Humira, minimal efficacy with Canasa suppositories, mesalamine enemas.  She was previously treated with a prednisone taper with good response.  Extraintestinal manifestations including arthritis.  Denies previous IBD surgical history.  Personal history of breast cancer.  Denies previous cardiac history. Patient has been in and out of the hospital for diverticulitis.  Most recently she was admitted from 5/10/2024 through 2024.  She was on a round of Augmentin and then a round of Cipro and Flagyl and finally this most recent time she was started on IV Zosyn and IV fluids.  A midline was placed and she was discharged to do infusions with ID outpatient.  This was completed on 2024.  Michelle presents for follow up. She is curious if she can come off of Rinvoq. She is having bumps on her scalp, joint pain, and lethargy since starting treatment. She would like to discuss changing treatment due to these side effects. She has post prandial bowel movements but feels controlled from a UC standpoint. She denies dysphagia, dyspepsia, pyrosis, abdominal pain, change in bowel habits, unintentional weight loss, melena, or hematochezia.

## 2024-07-01 NOTE — HPI-PREVIOUS LABS
5/20/2024 CBC significant for hemoglobin 11.8, RDW 16.1, PT/INR within normal limits, CMP significant for potassium 3.4, glucose 73, total bilirubin 1.4 otherwise within normal limits, UA within normal limits, CRP 1.1, stool studies including C. difficile negative  Labs dated 2/21/2024 showed normal CRP. Glucose 103. Normal renal function. Normal LFTs. Normal hemoglobin. Normal platelets.  Labs dated 2/7/2024 showed negative C. difficile. Normal hemoglobin. Normal platelets. Normal renal function. Normal LFTs. Normal CRP. HCV antibody negative. No immunity to hep A. No immunity to hep B. QuantiFERON gold negative. Calprotectin 756  Labs dated 7/6/2023 showed normal renal function. Normal LFTs. Normal TSH. Normal hemoglobin. Normal platelets.  Stool studies dated 8/3/2023 showed calprotectin of 524 but otherwise negative. C. difficile negative. 4/3/2024 CRP within normal limits, CMP significant for AST of 30, lipid panel significant for total cholesterol 205,  otherwise within normal limits, fecal calprotectin within normal limits at 98. Last hep B and TB - 2/2/2024.

## 2024-07-02 LAB
IMMUNOGLOBULIN A: 196
IMMUNOGLOBULIN G: 1056
IMMUNOGLOBULIN M: 100

## 2024-07-08 ENCOUNTER — LAB OUTSIDE AN ENCOUNTER (OUTPATIENT)
Dept: URBAN - METROPOLITAN AREA CLINIC 63 | Facility: CLINIC | Age: 64
End: 2024-07-08

## 2024-07-16 LAB
A/G RATIO: 1.8
ABSOLUTE BASOPHILS: 33
ABSOLUTE EOSINOPHILS: 59
ABSOLUTE LYMPHOCYTES: 1307
ABSOLUTE MONOCYTES: 409
ABSOLUTE NEUTROPHILS: 4792
ACTIN (SMOOTH MUSCLE) ANTIBODY (IGG): <20
ALBUMIN: 4.6
ALKALINE PHOSPHATASE: 57
ALT (SGPT): 22
ANA SCREEN, IFA: NEGATIVE
AST (SGOT): 19
BASOPHILS: 0.5
BILIRUBIN, TOTAL: 0.6
BUN/CREATININE RATIO: (no result)
BUN: 19
CALCIUM: 9.5
CARBON DIOXIDE, TOTAL: 24
CHLORIDE: 105
CREATININE: 0.72
EGFR: 94
EOSINOPHILS: 0.9
FERRITIN, SERUM: 130
GLOBULIN, TOTAL: 2.6
GLUCOSE: 126
HEMATOCRIT: 39.5
HEMOGLOBIN: 13.1
HEPATITIS C ANTIBODY: (no result)
HEREDITARY HEMOCHROMATOSIS DNA MUT: (no result)
IRON BIND.CAP.(TIBC): 299
IRON SATURATION: 30
IRON: 90
LKM-1 ANTIBODY (IGG): <=20
LYMPHOCYTES: 19.8
MCH: 31.6
MCHC: 33.2
MCV: 95.4
MITOCHONDRIAL (M2) ANTIBODY: <=20
MONOCYTES: 6.2
MPV: 12
NEUTROPHILS: 72.6
PLATELET COUNT: 229
POTASSIUM: 3.8
PROTEIN, TOTAL: 7.2
RDW: 13.2
RED BLOOD CELL COUNT: 4.14
RHEUMATOID FACTOR: <10
SJOGREN'S ANTIBODY (SS-A): (no result)
SJOGREN'S ANTIBODY (SS-B): (no result)
SODIUM: 140
WHITE BLOOD CELL COUNT: 6.6

## 2024-08-22 ENCOUNTER — CLAIMS CREATED FROM THE CLAIM WINDOW (OUTPATIENT)
Dept: URBAN - METROPOLITAN AREA SURGERY CENTER 4 | Facility: SURGERY CENTER | Age: 64
End: 2024-08-22
Payer: COMMERCIAL

## 2024-08-22 ENCOUNTER — CLAIMS CREATED FROM THE CLAIM WINDOW (OUTPATIENT)
Dept: URBAN - METROPOLITAN AREA CLINIC 4 | Facility: CLINIC | Age: 64
End: 2024-08-22
Payer: COMMERCIAL

## 2024-08-22 DIAGNOSIS — K64.0 FIRST DEGREE HEMORRHOIDS: ICD-10-CM

## 2024-08-22 DIAGNOSIS — K57.30 DIVERTICULOSIS OF LARGE INTESTINE WITHOUT PERFORATION OR ABSCESS WITHOUT BLEEDING: ICD-10-CM

## 2024-08-22 DIAGNOSIS — K51.50 LEFT SIDED COLITIS WITHOUT COMPLICATIONS: ICD-10-CM

## 2024-08-22 DIAGNOSIS — Z12.11 COLON CANCER SCREENING: ICD-10-CM

## 2024-08-22 DIAGNOSIS — K63.89 OTHER SPECIFIED DISEASES OF INTESTINE: ICD-10-CM

## 2024-08-22 PROCEDURE — 45380 COLONOSCOPY AND BIOPSY: CPT | Performed by: INTERNAL MEDICINE

## 2024-08-22 PROCEDURE — 88305 TISSUE EXAM BY PATHOLOGIST: CPT | Performed by: PATHOLOGY

## 2024-08-22 PROCEDURE — 00812 ANES LWR INTST SCR COLSC: CPT | Performed by: NURSE ANESTHETIST, CERTIFIED REGISTERED

## 2024-08-22 RX ORDER — ONDANSETRON HYDROCHLORIDE 4 MG/1
1 TABLET TABLET, FILM COATED ORAL
Qty: 2 | Refills: 0 | Status: ACTIVE | COMMUNITY
Start: 2024-07-01

## 2024-08-22 RX ORDER — ERGOCALCIFEROL CAPSULES, 1.25 MG/1
1 CAPSULE CAPSULE ORAL WEEKLY
Status: ACTIVE | COMMUNITY

## 2024-08-22 RX ORDER — EZETIMIBE 10 MG/1
TAKE 1 TABLET BY MOUTH EVERY DAY TABLET ORAL
Qty: 30 EACH | Refills: 0 | Status: ACTIVE | COMMUNITY

## 2024-09-16 ENCOUNTER — OFFICE VISIT (OUTPATIENT)
Dept: URBAN - METROPOLITAN AREA CLINIC 63 | Facility: CLINIC | Age: 64
End: 2024-09-16
Payer: COMMERCIAL

## 2024-09-16 VITALS
BODY MASS INDEX: 27.77 KG/M2 | OXYGEN SATURATION: 98 % | SYSTOLIC BLOOD PRESSURE: 105 MMHG | TEMPERATURE: 96.4 F | HEART RATE: 77 BPM | HEIGHT: 68 IN | DIASTOLIC BLOOD PRESSURE: 72 MMHG | WEIGHT: 183.2 LBS

## 2024-09-16 DIAGNOSIS — K57.30 DIVERTICULOSIS OF LARGE INTESTINE WITHOUT PERFORATION OR ABSCESS WITHOUT BLEEDING: ICD-10-CM

## 2024-09-16 DIAGNOSIS — K64.0 FIRST DEGREE HEMORRHOIDS: ICD-10-CM

## 2024-09-16 DIAGNOSIS — K51.50 LEFT SIDED COLITIS WITHOUT COMPLICATIONS: ICD-10-CM

## 2024-09-16 DIAGNOSIS — K76.0 FATTY LIVER: ICD-10-CM

## 2024-09-16 PROCEDURE — 99214 OFFICE O/P EST MOD 30 MIN: CPT

## 2024-09-16 RX ORDER — ERGOCALCIFEROL CAPSULES, 1.25 MG/1
1 CAPSULE CAPSULE ORAL WEEKLY
Status: ACTIVE | COMMUNITY

## 2024-09-16 RX ORDER — EZETIMIBE 10 MG/1
TAKE 1 TABLET BY MOUTH EVERY DAY TABLET ORAL
Qty: 30 EACH | Refills: 0 | Status: ACTIVE | COMMUNITY

## 2024-09-16 NOTE — HPI-ZZZTODAY'S VISIT
Patient is a very pleasant 64-year-old female who presents for procedure follow-up.  She is a patient of Dr. Riggins.  I last saw her 2024.  Past medical history significant for left-sided ulcerative colitis, GERD, osteoarthritis, breast cancer, vitamin D deficiency, pulmonary sarcoidosis, hyperlipidemia, hepatic steatosis.  Past surgical history significant for  section, mastectomy.  Last colonoscopy 2024 with Dr. Riggins.  Last endoscopy 2020.  Family history noncontributory. Previously patient was diagnosed with ulcerative colitis in .  Last colonoscopy 2024 demonstrated endoscopic remission.  She was started on Rinvoq 30 mg in .  She has previously failed mesalamine, Humira and had minimal efficacy with Canasa suppositories and mesalamine enemas.  She has previously been treated with a prednisone taper with good response but not in the last year.  Extraintestinal manifestations include arthralgias.  She denies a previous IBD surgical history.  She does have a personal history of breast cancer.  She denies previous cardiac history. In May 2024 she had been in and out of the hospital for diverticulitis.  She was treated with Augmentin and then a round of Cipro and Flagyl and finally by a course of IV Zosyn.  A midline was placed and she was discharged to do infusions with ID outpatient and completed treatment on 2024. At her last office visit Michelle presented curious if she could come off of her Rinvoq.  She reported she was experiencing bumps on her scalp, joint pain and lethargy since starting treatment.  She wanted to discuss changing treatment due to the side effects.  She has postprandial bowel movements but feels controlled from a UC standpoint.  We agreed that she would have a repeat colonoscopy for staging before deciding to change medications.  Especially in the context of her diverticulitis flares recently.  She also had elevated LFTs for which a workup was done for autoimmune and hepatitis. Patient presents for follow up. Since having her procedure she has reconsidered her medication treatment and agrees that it would be best for her to stay on Rinvoq as it has controlled her disease well. She denies dysphagia, dyspepsia, pyrosis, abdominal pain, change in bowel habits, unintentional weight loss, melena, or hematochezia. Patient denies jaundice, pruritus, change in mentation, increased confusion, abdominal distention, recent weight gain, or sleep disturbances.

## 2024-09-16 NOTE — HPI-PREVIOUS LABS
7/1/2024 IgG, iron and TIBC, ferritin, ASMA, AMA, LK M, PHILLY, CMP, CBC, hemochromatosis, hepatitis C, all within normal limits 5/20/2024 CBC significant for hemoglobin 11.8, RDW 16.1, PT/INR within normal limits, CMP significant for potassium 3.4, glucose 73, total bilirubin 1.4 otherwise within normal limits, UA within normal limits, CRP 1.1, stool studies including C. difficile negative  Labs dated 2/21/2024 showed normal CRP. Glucose 103. Normal renal function. Normal LFTs. Normal hemoglobin. Normal platelets.  Labs dated 2/7/2024 showed negative C. difficile. Normal hemoglobin. Normal platelets. Normal renal function. Normal LFTs. Normal CRP. HCV antibody negative. No immunity to hep A. No immunity to hep B. QuantiFERON gold negative. Calprotectin 756  Labs dated 7/6/2023 showed normal renal function. Normal LFTs. Normal TSH. Normal hemoglobin. Normal platelets.  Stool studies dated 8/3/2023 showed calprotectin of 524 but otherwise negative. C. difficile negative. 4/3/2024 CRP within normal limits, CMP significant for AST of 30, lipid panel significant for total cholesterol 205,  otherwise within normal limits, fecal calprotectin within normal limits at 98. Last hep B and TB - 2/2/2024.

## 2024-09-16 NOTE — HPI-PREVIOUS IMAGING
FibroScan 4/3/2024 consistent with mild steatosis median CAP score 280 dB/m and moderate fibrosis F2 with a median V CTE score 11.9 kPa 54 1.13 which is considered low risk and repeat NIPT in 2 to 3 years unless clinical circumstances change 5/10/2024 CT abdomen pelvis with contrast consistent with acute transverse colon diverticulitis with no organized drainable fluid collection.  CT abdomen/pelvis with contrast dated 8/1/2023 showed no acute abnormality. Hepatomegaly and fatty infiltration of the liver. Consider FibroScan. Diffuse colonic diverticulosis most severe within the sigmoid colon

## 2024-09-16 NOTE — HPI-PREVIOUS PROCEDURES
8/22/2024 colonoscopy consistent with diverticulosis in the entire examined colon Examination otherwise normal Internal hemorrhoids Recall for 2 years Colonoscopy in August 2023 showed eroded (linear pattern), hemorrhagic, inflamed and ulcerated mucosa in the rectum, rectosigmoid colon and sigmoid colon. Pandiverticulosis coli. Internal hemorrhoids. Pathology showed patchy chronic active colitis consistent with partially treated UC. Negative for infectious organisms, dysplasia or malignancy. Rectal biopsy showed no significant abnormality   Colonoscopy January 2020 showed diverticulosis in the sigmoid and descending colon. Internal hemorrhoids. Sigmoid colon biopsy showed benign colonic mucosa with no specific pathologic alteration. Negative for acute inflammation, granulomata, malignancy and dysplasia  EGD in January 2020 showed a normal esophagus. Gastritis. Normal duodenum. Mild chronic inactive gastritis. Negative for H. pylori.

## 2024-09-30 ENCOUNTER — WEB ENCOUNTER (OUTPATIENT)
Dept: URBAN - METROPOLITAN AREA CLINIC 63 | Facility: CLINIC | Age: 64
End: 2024-09-30

## 2024-10-02 ENCOUNTER — TELEPHONE ENCOUNTER (OUTPATIENT)
Dept: URBAN - METROPOLITAN AREA CLINIC 63 | Facility: CLINIC | Age: 64
End: 2024-10-02

## 2024-10-03 ENCOUNTER — WEB ENCOUNTER (OUTPATIENT)
Dept: URBAN - METROPOLITAN AREA CLINIC 63 | Facility: CLINIC | Age: 64
End: 2024-10-03

## 2024-10-04 ENCOUNTER — WEB ENCOUNTER (OUTPATIENT)
Dept: URBAN - METROPOLITAN AREA CLINIC 63 | Facility: CLINIC | Age: 64
End: 2024-10-04

## 2024-11-06 ENCOUNTER — TELEPHONE ENCOUNTER (OUTPATIENT)
Dept: URBAN - METROPOLITAN AREA CLINIC 63 | Facility: CLINIC | Age: 64
End: 2024-11-06

## 2024-11-06 RX ORDER — UPADACITINIB 30 MG/1
1 TABLET TABLET, EXTENDED RELEASE ORAL ONCE A DAY
Qty: 30 | Refills: 5
Start: 2024-02-12 | End: 2025-05-05

## 2024-11-08 ENCOUNTER — TELEPHONE ENCOUNTER (OUTPATIENT)
Dept: URBAN - METROPOLITAN AREA CLINIC 63 | Facility: CLINIC | Age: 64
End: 2024-11-08

## 2024-11-08 RX ORDER — UPADACITINIB 30 MG/1
1 TABLET TABLET, EXTENDED RELEASE ORAL ONCE A DAY
Qty: 30 | Refills: 5
Start: 2024-02-12 | End: 2025-05-07

## 2024-11-09 ENCOUNTER — WEB ENCOUNTER (OUTPATIENT)
Dept: URBAN - METROPOLITAN AREA CLINIC 63 | Facility: CLINIC | Age: 64
End: 2024-11-09

## 2025-02-04 ENCOUNTER — TELEPHONE ENCOUNTER (OUTPATIENT)
Dept: URBAN - METROPOLITAN AREA CLINIC 63 | Facility: CLINIC | Age: 65
End: 2025-02-04

## 2025-02-04 PROBLEM — 41364008: Status: ACTIVE | Noted: 2025-02-04

## 2025-02-05 ENCOUNTER — LAB OUTSIDE AN ENCOUNTER (OUTPATIENT)
Dept: URBAN - METROPOLITAN AREA CLINIC 60 | Facility: CLINIC | Age: 65
End: 2025-02-05

## 2025-02-05 ENCOUNTER — OFFICE VISIT (OUTPATIENT)
Dept: URBAN - METROPOLITAN AREA CLINIC 60 | Facility: CLINIC | Age: 65
End: 2025-02-05
Payer: COMMERCIAL

## 2025-02-05 VITALS
HEART RATE: 92 BPM | TEMPERATURE: 98.7 F | SYSTOLIC BLOOD PRESSURE: 116 MMHG | DIASTOLIC BLOOD PRESSURE: 68 MMHG | WEIGHT: 179.4 LBS | RESPIRATION RATE: 12 BRPM | HEIGHT: 68 IN | OXYGEN SATURATION: 98 % | BODY MASS INDEX: 27.19 KG/M2

## 2025-02-05 DIAGNOSIS — K51.30 ULCERATIVE RECTOSIGMOIDITIS WITHOUT COMPLICATION: ICD-10-CM

## 2025-02-05 DIAGNOSIS — R10.84 GENERALIZED ABDOMINAL PAIN: ICD-10-CM

## 2025-02-05 PROCEDURE — 99214 OFFICE O/P EST MOD 30 MIN: CPT

## 2025-02-05 RX ORDER — UPADACITINIB 30 MG/1
1 TABLET TABLET, EXTENDED RELEASE ORAL ONCE A DAY
Qty: 30 | Refills: 5 | Status: ACTIVE | COMMUNITY
Start: 2024-02-12 | End: 2025-05-07

## 2025-02-05 RX ORDER — ERGOCALCIFEROL CAPSULES, 1.25 MG/1
1 CAPSULE CAPSULE ORAL WEEKLY
Status: ACTIVE | COMMUNITY

## 2025-02-05 RX ORDER — EZETIMIBE 10 MG/1
TAKE 1 TABLET BY MOUTH EVERY DAY TABLET ORAL
Qty: 30 EACH | Refills: 0 | Status: ACTIVE | COMMUNITY

## 2025-02-05 NOTE — PHYSICAL EXAM GASTROINTESTINAL
Abdomen , soft, right sided and LLQ tenderness, nondistended , no guarding or rigidity , no masses palpable , normal bowel sounds , Liver and Spleen,  no hepatosplenomegaly , liver nontender

## 2025-02-05 NOTE — HPI-ZZZTODAY'S VISIT
Patient is a 64-year-old female with past medical history of UC diagnosed in 2009 currently on Rinvoq, GERD, hepatic steatosis and breast cancer who presents today with complaints of abdominal pain.  Patient called the office yesterday complaining of right sided abdominal pain along with nausea.  Urinalysis was ordered however we have not received results from test yet.  Patient was also told to hold her invoke due to concern over possible UTI.  At today's visit she states that she has more generalized abdominal pain that is worse on the right flank and LLQ.  She does have a history of renal stone and thought maybe this was the cause of her pain as she is not having on any other symptoms associated with UTI.  She also has a history of diverticulitis however she states this pain does not feel like her last episode of diverticulitis.  She denies fever, diarrhea, hematochezia and melena. . 8/22/2024 colonoscopy consistent with diverticulosis in the entire examined colon Examination otherwise normal Internal hemorrhoids Recall for 2 years  . Colonoscopy in August 2023 showed eroded (linear pattern), hemorrhagic, inflamed and ulcerated mucosa in the rectum, rectosigmoid colon and sigmoid colon. Pandiverticulosis coli. Internal hemorrhoids. Pathology showed patchy chronic active colitis consistent with partially treated UC. Negative for infectious organisms, dysplasia or malignancy. Rectal biopsy showed no significant abnormality . Colonoscopy January 2020 showed diverticulosis in the sigmoid and descending colon. Internal hemorrhoids. Sigmoid colon biopsy showed benign colonic mucosa with no specific pathologic alteration. Negative for acute inflammation, granulomata, malignancy and dysplasia . EGD in January 2020 showed a normal esophagus. Gastritis. Normal duodenum. Mild chronic inactive gastritis. Negative for H. pylori. . FibroScan 4/3/2024 consistent with mild steatosis median CAP score 280 dB/m and moderate fibrosis F2 with a median V CTE score 11.9 kPa 54 1.13 which is considered low risk and repeat NIPT in 2 to 3 years unless clinical circumstances change  . 5/10/2024 CT abdomen pelvis with contrast consistent with acute transverse colon diverticulitis with no organized drainable fluid collection. . CT abdomen/pelvis with contrast dated 8/1/2023 showed no acute abnormality. Hepatomegaly and fatty infiltration of the liver. Consider FibroScan. Diffuse colonic diverticulosis most severe within the sigmoid colon . 7/1/2024 IgG, iron and TIBC, ferritin, ASMA, AMA, LK M, PHILLY, CMP, CBC, hemochromatosis, hepatitis C, all within normal limits 5/20/2024 CBC significant for hemoglobin 11.8, RDW 16.1, PT/INR within normal limits, CMP significant for potassium 3.4, glucose 73, total bilirubin 1.4 otherwise within normal limits, UA within normal limits, CRP 1.1, stool studies including C. difficile negative  Labs dated 2/21/2024 showed normal CRP. Glucose 103. Normal renal function. Normal LFTs. Normal hemoglobin. Normal platelets.  Labs dated 2/7/2024 showed negative C. difficile. Normal hemoglobin. Normal platelets. Normal renal function. Normal LFTs. Normal CRP. HCV antibody negative. No immunity to hep A. No immunity to hep B. QuantiFERON gold negative. Calprotectin 756  Labs dated 7/6/2023 showed normal renal function. Normal LFTs. Normal TSH. Normal hemoglobin. Normal platelets.  Stool studies dated 8/3/2023 showed calprotectin of 524 but otherwise negative. C. difficile negative. 4/3/2024 CRP within normal limits, CMP significant for AST of 30, lipid panel significant for total cholesterol 205,  otherwise within normal limits, fecal calprotectin within normal limits at 98. Last hep B and TB - 2/2/2024.

## 2025-02-06 ENCOUNTER — WEB ENCOUNTER (OUTPATIENT)
Dept: URBAN - METROPOLITAN AREA CLINIC 60 | Facility: CLINIC | Age: 65
End: 2025-02-06

## 2025-02-06 LAB
APPEARANCE: CLEAR
BACTERIA: (no result)
BILIRUBIN: NEGATIVE
COLOR: YELLOW
GLUCOSE: NEGATIVE
HYALINE CAST: (no result)
KETONES: NEGATIVE
LEUKOCYTE ESTERASE: NEGATIVE
NITRITE: NEGATIVE
NOTE: (no result)
OCCULT BLOOD: NEGATIVE
PH: 5.5
PROTEIN: (no result)
RBC: (no result)
REFLEXIVE URINE CULTURE: (no result)
SPECIFIC GRAVITY: 1.02
SQUAMOUS EPITHELIAL CELLS: (no result)
WBC: (no result)

## 2025-02-12 ENCOUNTER — WEB ENCOUNTER (OUTPATIENT)
Dept: URBAN - METROPOLITAN AREA CLINIC 60 | Facility: CLINIC | Age: 65
End: 2025-02-12

## 2025-02-12 RX ORDER — CIPROFLOXACIN 500 MG/1
1 TABLET TABLET, FILM COATED ORAL
Qty: 20 TABLET | Refills: 0 | OUTPATIENT
Start: 2025-02-12 | End: 2025-02-22

## 2025-02-12 RX ORDER — METRONIDAZOLE 500 MG/1
1 TABLET TABLET ORAL TWICE A DAY
Qty: 20 TABLET | Refills: 0 | OUTPATIENT
Start: 2025-02-12 | End: 2025-02-22

## 2025-02-17 ENCOUNTER — WEB ENCOUNTER (OUTPATIENT)
Dept: URBAN - METROPOLITAN AREA CLINIC 60 | Facility: CLINIC | Age: 65
End: 2025-02-17

## 2025-02-19 ENCOUNTER — WEB ENCOUNTER (OUTPATIENT)
Dept: URBAN - METROPOLITAN AREA CLINIC 60 | Facility: CLINIC | Age: 65
End: 2025-02-19

## 2025-02-25 ENCOUNTER — TELEPHONE ENCOUNTER (OUTPATIENT)
Dept: URBAN - METROPOLITAN AREA CLINIC 63 | Facility: CLINIC | Age: 65
End: 2025-02-25

## 2025-02-26 ENCOUNTER — TELEPHONE ENCOUNTER (OUTPATIENT)
Dept: URBAN - METROPOLITAN AREA CLINIC 60 | Facility: CLINIC | Age: 65
End: 2025-02-26

## 2025-03-16 ENCOUNTER — LAB OUTSIDE AN ENCOUNTER (OUTPATIENT)
Dept: URBAN - METROPOLITAN AREA CLINIC 63 | Facility: CLINIC | Age: 65
End: 2025-03-16

## 2025-03-20 ENCOUNTER — OFFICE VISIT (OUTPATIENT)
Dept: URBAN - METROPOLITAN AREA CLINIC 60 | Facility: CLINIC | Age: 65
End: 2025-03-20

## 2025-03-20 ENCOUNTER — OFFICE VISIT (OUTPATIENT)
Dept: URBAN - METROPOLITAN AREA CLINIC 63 | Facility: CLINIC | Age: 65
End: 2025-03-20

## 2025-03-24 ENCOUNTER — TELEPHONE ENCOUNTER (OUTPATIENT)
Dept: URBAN - METROPOLITAN AREA CLINIC 63 | Facility: CLINIC | Age: 65
End: 2025-03-24

## 2025-03-27 ENCOUNTER — TELEPHONE ENCOUNTER (OUTPATIENT)
Dept: URBAN - METROPOLITAN AREA CLINIC 63 | Facility: CLINIC | Age: 65
End: 2025-03-27

## 2025-03-28 ENCOUNTER — OFFICE VISIT (OUTPATIENT)
Dept: URBAN - METROPOLITAN AREA CLINIC 60 | Facility: CLINIC | Age: 65
End: 2025-03-28

## 2025-03-29 LAB
A/G RATIO: 1.6
ABSOLUTE BASOPHILS: 32
ABSOLUTE EOSINOPHILS: 69
ABSOLUTE LYMPHOCYTES: 1431
ABSOLUTE MONOCYTES: 440
ABSOLUTE NEUTROPHILS: 3328
ALBUMIN: 4.6
ALKALINE PHOSPHATASE: 54
ALT (SGPT): 25
AST (SGOT): 22
BASOPHILS: 0.6
BILIRUBIN, TOTAL: 1
BUN/CREATININE RATIO: (no result)
BUN: 15
C-REACTIVE PROTEIN, QUANT: <3
CALCIUM: 9.6
CARBON DIOXIDE, TOTAL: 32
CHLORIDE: 105
CREATININE: 0.64
EGFR: 99
EOSINOPHILS: 1.3
GLOBULIN, TOTAL: 2.8
GLUCOSE: 82
HEMATOCRIT: 42.1
HEMOGLOBIN: 13.7
LYMPHOCYTES: 27
MCH: 30.7
MCHC: 32.5
MCV: 94.4
MONOCYTES: 8.3
MPV: 11.9
NEUTROPHILS: 62.8
PLATELET COUNT: 214
POTASSIUM: 4.8
PROTEIN, TOTAL: 7.4
RDW: 13.4
RED BLOOD CELL COUNT: 4.46
SODIUM: 141
WHITE BLOOD CELL COUNT: 5.3

## 2025-04-02 ENCOUNTER — APPOINTMENT (OUTPATIENT)
Dept: URBAN - METROPOLITAN AREA CLINIC 121 | Facility: CLINIC | Age: 65
Setting detail: DERMATOLOGY
End: 2025-04-02

## 2025-04-02 DIAGNOSIS — D49.2 NEOPLASM OF UNSPECIFIED BEHAVIOR OF BONE, SOFT TISSUE, AND SKIN: ICD-10-CM

## 2025-04-02 DIAGNOSIS — Z71.89 OTHER SPECIFIED COUNSELING: ICD-10-CM

## 2025-04-02 PROCEDURE — ? SHAVE REMOVAL

## 2025-04-02 PROCEDURE — ? PHOTO-DOCUMENTATION

## 2025-04-02 PROCEDURE — 11301 SHAVE SKIN LESION 0.6-1.0 CM: CPT

## 2025-04-02 PROCEDURE — ? COUNSELING

## 2025-04-02 PROCEDURE — 99212 OFFICE O/P EST SF 10 MIN: CPT | Mod: 25

## 2025-04-02 ASSESSMENT — LOCATION ZONE DERM: LOCATION ZONE: LEG

## 2025-04-02 ASSESSMENT — LOCATION DETAILED DESCRIPTION DERM: LOCATION DETAILED: LEFT PROXIMAL CALF

## 2025-04-02 ASSESSMENT — LOCATION SIMPLE DESCRIPTION DERM: LOCATION SIMPLE: LEFT CALF

## 2025-04-02 NOTE — HPI: SKIN LESIONS
Have Your Skin Lesions Been Treated?: not been treated
Is This A New Presentation, Or A Follow-Up?: Skin Lesion
How Severe Is Your Skin Lesion?: mild
Additional History: Patient has a spot on the back of her left calf

## 2025-04-02 NOTE — PROCEDURE: SHAVE REMOVAL
Medical Necessity Information: It is in your best interest to select a reason for this procedure from the list below. All of these items fulfill various CMS LCD requirements except the new and changing color options.
Medical Necessity Clause: This procedure was medically necessary because the lesion that was treated was:
Lab: [id number]
Lab Facility: [id number]
Detail Level: Detailed
Was A Bandage Applied: Yes
Size Of Lesion In Cm (Required): 0.7
X Size Of Lesion In Cm (Optional): 0
Depth Of Shave: dermis
Biopsy Method: Dermablade
Anesthesia Type: 1% lidocaine with epinephrine
Anesthesia Volume In Cc: 1
Hemostasis: Aluminum Chloride
Wound Care: Petrolatum
Path Notes (To The Dermatopathologist): Check margins
Render Path Notes In Note?: No
Consent was obtained from the patient. The risks and benefits to therapy were discussed in detail. Specifically, the risks of infection, scarring, bleeding, prolonged wound healing, incomplete removal, allergy to anesthesia, nerve injury and recurrence were addressed. Prior to the procedure, the treatment site was clearly identified and confirmed by the patient. All components of Universal Protocol/PAUSE Rule completed.
Post-Care Instructions: I reviewed with the patient in detail post-care instructions. Patient is to keep the biopsy site dry overnight, and then apply bacitracin twice daily until healed. Patient may apply hydrogen peroxide soaks to remove any crusting.
Notification Instructions: Patient will be notified of pathology results. However, patient instructed to call the office if not contacted within 2 weeks.
Billing Type: Third-Party Bill

## 2025-04-11 ENCOUNTER — LAB OUTSIDE AN ENCOUNTER (OUTPATIENT)
Dept: URBAN - METROPOLITAN AREA CLINIC 60 | Facility: CLINIC | Age: 65
End: 2025-04-11

## 2025-04-11 ENCOUNTER — OFFICE VISIT (OUTPATIENT)
Dept: URBAN - METROPOLITAN AREA CLINIC 60 | Facility: CLINIC | Age: 65
End: 2025-04-11
Payer: COMMERCIAL

## 2025-04-11 DIAGNOSIS — K76.0 FATTY LIVER: ICD-10-CM

## 2025-04-11 DIAGNOSIS — K51.50 LEFT SIDED ULCERATIVE (CHRONIC) COLITIS: ICD-10-CM

## 2025-04-11 DIAGNOSIS — K21.9 CHRONIC GERD: ICD-10-CM

## 2025-04-11 PROCEDURE — 99213 OFFICE O/P EST LOW 20 MIN: CPT

## 2025-04-11 RX ORDER — UPADACITINIB 30 MG/1
1 TABLET TABLET, EXTENDED RELEASE ORAL ONCE A DAY
Qty: 30 | Refills: 5 | Status: ACTIVE | COMMUNITY
Start: 2024-02-12 | End: 2025-05-07

## 2025-04-11 RX ORDER — EZETIMIBE 10 MG/1
TAKE 1 TABLET BY MOUTH EVERY DAY TABLET ORAL
Qty: 30 EACH | Refills: 0 | Status: ACTIVE | COMMUNITY

## 2025-04-11 RX ORDER — ERGOCALCIFEROL CAPSULES, 1.25 MG/1
1 CAPSULE CAPSULE ORAL WEEKLY
Status: ACTIVE | COMMUNITY

## 2025-04-11 NOTE — HPI-ZZZTODAY'S VISIT
Patient is a 64-year-old female with a past medical history of breast cancer, UC diagnosed in 2009 currently on Rinvoq, GERD and hepatic steatosis who presents today for follow-up.  At today's visit patient states her symptoms have improved since last visit.  She states that she still has some intermittent right sided flank pain however she does have issues with her back and is unsure if this pain is due to that.  She is currently following with a spine doctor however she has to undergo physical therapy before she can have an MRI of her back done.  Did discuss with her that a small kidney stone was also found on that CT scan in February however she does not believe that this right-sided pain is due to the kidney stone.  Reviewed most recent labs and discussed with patient that she is due for a repeat FibroScan at this time.  She states that she is doing well on Rinvoq and her UC is currently under control.  She is due for repeat colonoscopy in August 2026.  Patient denies fever, dysphagia, acid reflux, change in appetite, nausea, vomiting, diarrhea, constipation, hematemesis, hematochezia, melena, change in stool frequency/caliber, unintentional weight loss.  . 8/22/2024 colonoscopy consistent with diverticulosis in the entire examined colon Examination otherwise normal Internal hemorrhoids Recall for 2 years  . Colonoscopy in August 2023 showed eroded (linear pattern), hemorrhagic, inflamed and ulcerated mucosa in the rectum, rectosigmoid colon and sigmoid colon. Pandiverticulosis coli. Internal hemorrhoids. Pathology showed patchy chronic active colitis consistent with partially treated UC. Negative for infectious organisms, dysplasia or malignancy. Rectal biopsy showed no significant abnormality . Colonoscopy January 2020 showed diverticulosis in the sigmoid and descending colon. Internal hemorrhoids. Sigmoid colon biopsy showed benign colonic mucosa with no specific pathologic alteration. Negative for acute inflammation, granulomata, malignancy and dysplasia . EGD in January 2020 showed a normal esophagus. Gastritis. Normal duodenum. Mild chronic inactive gastritis. Negative for H. pylori. . CT abdomen and pelvis with and without contrast 2/10/2025 - Redemonstration of pancolonic diverticulosis however now with inflammatory changes about diverticular distalmost aspect of ascending colon consistent with acute uncomplicated diverticulitis - Trace simple free pelvic fluid within the right aspect of posterior cul-de-sac - 2 mm nonobstructing right interpolar renal calculus . FibroScan 4/3/2024 consistent with mild steatosis median CAP score 280 dB/m and moderate fibrosis F2 with a median V CTE score 11.9 kPa 54 1.13 which is considered low risk and repeat NIPT in 2 to 3 years unless clinical circumstances change  . 5/10/2024 CT abdomen pelvis with contrast consistent with acute transverse colon diverticulitis with no organized drainable fluid collection. . CT abdomen/pelvis with contrast dated 8/1/2023 showed no acute abnormality. Hepatomegaly and fatty infiltration of the liver. Consider FibroScan. Diffuse colonic diverticulosis most severe within the sigmoid colon . Labs 3/28/2025 - CMP within normal limits - CRP within normal limits - CBCD within normal limits - Fecal calprotectin within normal limits . 7/1/2024 IgG, iron and TIBC, ferritin, ASMA, AMA, LK M, PHILLY, CMP, CBC, hemochromatosis, hepatitis C, all within normal limits 5/20/2024 CBC significant for hemoglobin 11.8, RDW 16.1, PT/INR within normal limits, CMP significant for potassium 3.4, glucose 73, total bilirubin 1.4 otherwise within normal limits, UA within normal limits, CRP 1.1, stool studies including C. difficile negative  Labs dated 2/21/2024 showed normal CRP. Glucose 103. Normal renal function. Normal LFTs. Normal hemoglobin. Normal platelets.  Labs dated 2/7/2024 showed negative C. difficile. Normal hemoglobin. Normal platelets. Normal renal function. Normal LFTs. Normal CRP. HCV antibody negative. No immunity to hep A. No immunity to hep B. QuantiFERON gold negative. Calprotectin 756  Labs dated 7/6/2023 showed normal renal function. Normal LFTs. Normal TSH. Normal hemoglobin. Normal platelets.  Stool studies dated 8/3/2023 showed calprotectin of 524 but otherwise negative. C. difficile negative. 4/3/2024 CRP within normal limits, CMP significant for AST of 30, lipid panel significant for total cholesterol 205,  otherwise within normal limits, fecal calprotectin within normal limits at 98. Last hep B and TB - 2/2/2024.

## 2025-04-22 ENCOUNTER — WEB ENCOUNTER (OUTPATIENT)
Dept: URBAN - METROPOLITAN AREA CLINIC 60 | Facility: CLINIC | Age: 65
End: 2025-04-22

## 2025-04-29 ENCOUNTER — WEB ENCOUNTER (OUTPATIENT)
Dept: URBAN - METROPOLITAN AREA CLINIC 60 | Facility: CLINIC | Age: 65
End: 2025-04-29

## 2025-05-08 ENCOUNTER — OFFICE VISIT (OUTPATIENT)
Dept: URBAN - METROPOLITAN AREA CLINIC 61 | Facility: CLINIC | Age: 65
End: 2025-05-08

## 2025-05-08 RX ORDER — EZETIMIBE 10 MG/1
TAKE 1 TABLET BY MOUTH EVERY DAY TABLET ORAL
Qty: 30 EACH | Refills: 0 | Status: ACTIVE | COMMUNITY

## 2025-05-08 RX ORDER — ERGOCALCIFEROL CAPSULES, 1.25 MG/1
1 CAPSULE CAPSULE ORAL WEEKLY
Status: ACTIVE | COMMUNITY

## 2025-05-09 ENCOUNTER — OFFICE VISIT (OUTPATIENT)
Dept: URBAN - METROPOLITAN AREA CLINIC 61 | Facility: CLINIC | Age: 65
End: 2025-05-09
Payer: COMMERCIAL

## 2025-05-09 DIAGNOSIS — K76.0 FATTY LIVER: ICD-10-CM

## 2025-05-09 PROCEDURE — 76981 USE PARENCHYMA: CPT | Performed by: INTERNAL MEDICINE

## 2025-05-09 RX ORDER — ERGOCALCIFEROL CAPSULES, 1.25 MG/1
1 CAPSULE CAPSULE ORAL WEEKLY
Status: ACTIVE | COMMUNITY

## 2025-05-09 RX ORDER — EZETIMIBE 10 MG/1
TAKE 1 TABLET BY MOUTH EVERY DAY TABLET ORAL
Qty: 30 EACH | Refills: 0 | Status: ACTIVE | COMMUNITY

## 2025-05-23 ENCOUNTER — WEB ENCOUNTER (OUTPATIENT)
Dept: URBAN - METROPOLITAN AREA CLINIC 60 | Facility: CLINIC | Age: 65
End: 2025-05-23

## 2025-05-27 ENCOUNTER — WEB ENCOUNTER (OUTPATIENT)
Dept: URBAN - METROPOLITAN AREA CLINIC 60 | Facility: CLINIC | Age: 65
End: 2025-05-27

## 2025-06-07 ENCOUNTER — TELEPHONE ENCOUNTER (OUTPATIENT)
Dept: URBAN - METROPOLITAN AREA CLINIC 63 | Facility: CLINIC | Age: 65
End: 2025-06-07

## 2025-06-07 RX ORDER — CIPROFLOXACIN 500 MG/1
1 TABLET TABLET, FILM COATED ORAL
Qty: 20 TABLET | Refills: 0
Start: 2025-02-12 | End: 2025-06-17

## 2025-06-07 RX ORDER — METRONIDAZOLE 500 MG/1
1 TABLET TABLET ORAL
Qty: 30 TABLET | Refills: 0
Start: 2025-02-12 | End: 2025-06-17

## 2025-06-08 ENCOUNTER — WEB ENCOUNTER (OUTPATIENT)
Dept: URBAN - METROPOLITAN AREA CLINIC 60 | Facility: CLINIC | Age: 65
End: 2025-06-08

## 2025-06-10 ENCOUNTER — WEB ENCOUNTER (OUTPATIENT)
Dept: URBAN - METROPOLITAN AREA CLINIC 60 | Facility: CLINIC | Age: 65
End: 2025-06-10

## 2025-06-10 ENCOUNTER — ERX REFILL RESPONSE (OUTPATIENT)
Dept: URBAN - METROPOLITAN AREA CLINIC 63 | Facility: CLINIC | Age: 65
End: 2025-06-10

## 2025-06-10 RX ORDER — UPADACITINIB 30 MG/1
TAKE 1 TABLET BY MOUTH 1 TIME A DAY TABLET, EXTENDED RELEASE ORAL
Qty: 30 | Refills: 5 | OUTPATIENT

## 2025-06-12 ENCOUNTER — TELEPHONE ENCOUNTER (OUTPATIENT)
Dept: URBAN - METROPOLITAN AREA CLINIC 23 | Facility: CLINIC | Age: 65
End: 2025-06-12

## 2025-06-12 PROBLEM — 197321007: Status: ACTIVE | Noted: 2025-06-12

## 2025-06-13 ENCOUNTER — OFFICE VISIT (OUTPATIENT)
Dept: URBAN - METROPOLITAN AREA CLINIC 60 | Facility: CLINIC | Age: 65
End: 2025-06-13
Payer: COMMERCIAL

## 2025-06-13 ENCOUNTER — OFFICE VISIT (OUTPATIENT)
Dept: URBAN - METROPOLITAN AREA CLINIC 60 | Facility: CLINIC | Age: 65
End: 2025-06-13

## 2025-06-13 DIAGNOSIS — K51.30 ACUTE ULCERATIVE RECTOSIGMOIDITIS: ICD-10-CM

## 2025-06-13 DIAGNOSIS — K76.0 HEPATIC STEATOSIS: ICD-10-CM

## 2025-06-13 DIAGNOSIS — K57.92 DIVERTICULITIS: ICD-10-CM

## 2025-06-13 PROCEDURE — 99213 OFFICE O/P EST LOW 20 MIN: CPT

## 2025-06-13 RX ORDER — UPADACITINIB 30 MG/1
TAKE 1 TABLET BY MOUTH 1 TIME A DAY TABLET, EXTENDED RELEASE ORAL
Qty: 30 | Refills: 5 | Status: ACTIVE | COMMUNITY

## 2025-06-13 RX ORDER — METRONIDAZOLE 500 MG/1
1 TABLET TABLET ORAL
Qty: 30 TABLET | Refills: 0 | Status: ACTIVE | COMMUNITY
Start: 2025-02-12 | End: 2025-06-17

## 2025-06-13 RX ORDER — CIPROFLOXACIN 500 MG/1
1 TABLET TABLET, FILM COATED ORAL
Qty: 20 TABLET | Refills: 0 | Status: ACTIVE | COMMUNITY
Start: 2025-02-12 | End: 2025-06-17

## 2025-06-13 RX ORDER — EZETIMIBE 10 MG/1
TAKE 1 TABLET BY MOUTH EVERY DAY TABLET ORAL
Qty: 30 EACH | Refills: 0 | Status: ACTIVE | COMMUNITY

## 2025-06-13 RX ORDER — ERGOCALCIFEROL CAPSULES, 1.25 MG/1
1 CAPSULE CAPSULE ORAL WEEKLY
Status: ACTIVE | COMMUNITY

## 2025-08-18 ENCOUNTER — TELEPHONE ENCOUNTER (OUTPATIENT)
Dept: URBAN - METROPOLITAN AREA CLINIC 60 | Facility: CLINIC | Age: 65
End: 2025-08-18